# Patient Record
Sex: FEMALE | Race: WHITE | NOT HISPANIC OR LATINO | Employment: OTHER | URBAN - METROPOLITAN AREA
[De-identification: names, ages, dates, MRNs, and addresses within clinical notes are randomized per-mention and may not be internally consistent; named-entity substitution may affect disease eponyms.]

---

## 2022-02-16 ENCOUNTER — OFFICE VISIT (OUTPATIENT)
Dept: PODIATRY | Facility: CLINIC | Age: 57
End: 2022-02-16
Payer: MEDICARE

## 2022-02-16 VITALS
BODY MASS INDEX: 30.72 KG/M2 | HEIGHT: 67 IN | SYSTOLIC BLOOD PRESSURE: 100 MMHG | WEIGHT: 195.75 LBS | DIASTOLIC BLOOD PRESSURE: 68 MMHG | OXYGEN SATURATION: 97 % | HEART RATE: 65 BPM

## 2022-02-16 DIAGNOSIS — Z86.69 H/O PERIPHERAL NEUROPATHY: ICD-10-CM

## 2022-02-16 DIAGNOSIS — E11.9 COMPREHENSIVE DIABETIC FOOT EXAMINATION, TYPE 2 DM, ENCOUNTER FOR: Primary | ICD-10-CM

## 2022-02-16 DIAGNOSIS — L60.8 ACQUIRED DYSMORPHIC TOENAIL: ICD-10-CM

## 2022-02-16 DIAGNOSIS — G57.92 NERVE DAMAGE OF LEFT FOOT: ICD-10-CM

## 2022-02-16 DIAGNOSIS — E11.9 CONTROLLED TYPE 2 DIABETES MELLITUS WITHOUT COMPLICATION, WITHOUT LONG-TERM CURRENT USE OF INSULIN: ICD-10-CM

## 2022-02-16 PROCEDURE — 1160F RVW MEDS BY RX/DR IN RCRD: CPT | Mod: CPTII,S$GLB,, | Performed by: PODIATRIST

## 2022-02-16 PROCEDURE — 1160F PR REVIEW ALL MEDS BY PRESCRIBER/CLIN PHARMACIST DOCUMENTED: ICD-10-PCS | Mod: CPTII,S$GLB,, | Performed by: PODIATRIST

## 2022-02-16 PROCEDURE — 3078F PR MOST RECENT DIASTOLIC BLOOD PRESSURE < 80 MM HG: ICD-10-PCS | Mod: CPTII,S$GLB,, | Performed by: PODIATRIST

## 2022-02-16 PROCEDURE — 3074F SYST BP LT 130 MM HG: CPT | Mod: CPTII,S$GLB,, | Performed by: PODIATRIST

## 2022-02-16 PROCEDURE — 99203 OFFICE O/P NEW LOW 30 MIN: CPT | Mod: S$GLB,,, | Performed by: PODIATRIST

## 2022-02-16 PROCEDURE — 3078F DIAST BP <80 MM HG: CPT | Mod: CPTII,S$GLB,, | Performed by: PODIATRIST

## 2022-02-16 PROCEDURE — 3008F PR BODY MASS INDEX (BMI) DOCUMENTED: ICD-10-PCS | Mod: CPTII,S$GLB,, | Performed by: PODIATRIST

## 2022-02-16 PROCEDURE — 1159F MED LIST DOCD IN RCRD: CPT | Mod: CPTII,S$GLB,, | Performed by: PODIATRIST

## 2022-02-16 PROCEDURE — 3008F BODY MASS INDEX DOCD: CPT | Mod: CPTII,S$GLB,, | Performed by: PODIATRIST

## 2022-02-16 PROCEDURE — 3074F PR MOST RECENT SYSTOLIC BLOOD PRESSURE < 130 MM HG: ICD-10-PCS | Mod: CPTII,S$GLB,, | Performed by: PODIATRIST

## 2022-02-16 PROCEDURE — 1159F PR MEDICATION LIST DOCUMENTED IN MEDICAL RECORD: ICD-10-PCS | Mod: CPTII,S$GLB,, | Performed by: PODIATRIST

## 2022-02-16 PROCEDURE — 99999 PR PBB SHADOW E&M-NEW PATIENT-LVL IV: CPT | Mod: PBBFAC,,, | Performed by: PODIATRIST

## 2022-02-16 PROCEDURE — 99999 PR PBB SHADOW E&M-NEW PATIENT-LVL IV: ICD-10-PCS | Mod: PBBFAC,,, | Performed by: PODIATRIST

## 2022-02-16 PROCEDURE — 99203 PR OFFICE/OUTPT VISIT, NEW, LEVL III, 30-44 MIN: ICD-10-PCS | Mod: S$GLB,,, | Performed by: PODIATRIST

## 2022-02-16 RX ORDER — METFORMIN HYDROCHLORIDE 500 MG/1
1000 TABLET ORAL 2 TIMES DAILY
COMMUNITY
Start: 2021-12-06

## 2022-02-16 RX ORDER — ATORVASTATIN CALCIUM 20 MG/1
20 TABLET, FILM COATED ORAL DAILY
COMMUNITY
Start: 2021-09-29 | End: 2022-08-20 | Stop reason: ALTCHOICE

## 2022-02-16 RX ORDER — CYCLOBENZAPRINE HCL 10 MG
10 TABLET ORAL 2 TIMES DAILY PRN
COMMUNITY
Start: 2022-01-11 | End: 2022-08-20 | Stop reason: SDUPTHER

## 2022-02-16 RX ORDER — GABAPENTIN 400 MG/1
CAPSULE ORAL
COMMUNITY
Start: 2022-01-11

## 2022-02-16 RX ORDER — PROPRANOLOL HYDROCHLORIDE 10 MG/1
10 TABLET ORAL 2 TIMES DAILY
COMMUNITY
Start: 2022-01-31

## 2022-02-20 NOTE — PROGRESS NOTES
"Subjective:       Patient ID: Harriett Kennedy is a 56 y.o. female.    Chief Complaint: Diabetic Foot Exam  Patient presents for diabetic foot exam. Complaint of painful nail 2nd digit left, no h/o trauma.  Reports history type 2 diabetes 2-3 years.  States it is well controlled.  Does check glucose daily usually in the 90s.  Has nerve damage due to back issues/bulging discs, takes gabapentin.  No history foot sores or infections.      Past Medical History:   Diagnosis Date    Diabetes mellitus, type 2     Hyperlipidemia     Nerve damage of left foot      History reviewed. No pertinent surgical history.  History reviewed. No pertinent family history.  Social History     Socioeconomic History    Marital status: Single   Tobacco Use    Smoking status: Current Every Day Smoker     Types: Cigarettes       Current Outpatient Medications   Medication Sig Dispense Refill    atorvastatin (LIPITOR) 20 MG tablet Take 20 mg by mouth once daily.      cyclobenzaprine (FLEXERIL) 10 MG tablet Take 10 mg by mouth 2 (two) times daily as needed.      gabapentin (NEURONTIN) 400 MG capsule Take by mouth.      metFORMIN (GLUCOPHAGE) 500 MG tablet Take 1,000 mg by mouth 2 (two) times daily.      propranoloL (INDERAL) 10 MG tablet Take 10 mg by mouth 2 (two) times daily.       No current facility-administered medications for this visit.     Review of patient's allergies indicates:   Allergen Reactions    Iron analogues Nausea And Vomiting    Neomycin Rash    Sulfa (sulfonamide antibiotics)        Review of Systems   HENT: Negative for congestion.    Respiratory: Negative for cough and shortness of breath.    Cardiovascular: Negative for leg swelling.   Musculoskeletal: Negative for gait problem.   All other systems reviewed and are negative.      Objective:      Vitals:    02/16/22 1014   BP: 100/68   Pulse: 65   SpO2: 97%   Weight: 88.8 kg (195 lb 12.3 oz)   Height: 5' 7" (1.702 m)     Physical Exam  Vitals and nursing note " reviewed.   Constitutional:       General: She is not in acute distress.  Cardiovascular:      Pulses:           Dorsalis pedis pulses are 2+ on the right side and 2+ on the left side.        Posterior tibial pulses are 2+ on the right side and 2+ on the left side.   Pulmonary:      Effort: Pulmonary effort is normal.   Musculoskeletal:         General: No swelling or deformity.      Right foot: Normal range of motion.      Left foot: Normal range of motion.   Feet:      Right foot:      Protective Sensation: 6 sites tested. 6 sites sensed.      Skin integrity: Dry skin present.      Toenail Condition: Right toenails are long.      Left foot:      Protective Sensation: 6 sites tested. 4 sites sensed.      Skin integrity: Dry skin (Neglected nails, tender dystrophic fungal 2nd digit nail with no sign of infection) present.      Toenail Condition: Left toenails are abnormally thick and long. Fungal disease present.  Skin:     Capillary Refill: Capillary refill takes less than 2 seconds.   Neurological:      Comments: Detected monofilament all areas right foot, lack of sensation medial left arch region, intact elsewhere left foot.  Painful paresthesias left foot consistent with neuropathy                      Assessment:       1. Comprehensive diabetic foot examination, type 2 DM, encounter for    2. Controlled type 2 diabetes mellitus without complication, without long-term current use of insulin    3. Nerve damage of left foot    4. H/O peripheral neuropathy    5. Acquired dysmorphic toenail - Left Foot        Plan:             Diabetic pedal exam performed.    Reviewed results monofilament testing intact all areas right foot diminished medial left foot  We reviewed neuropathy left foot due to nerve damage, symptoms, gabapentin  Reviewed diabetic education  Discussed benefit of continued tight(er) control of glucose/diabetes regarding potential foot problems especially overall foot neuropathy secondary to diabetes    Reviewed wide light appropriate shoes,  especially indoors to protect feet, and for stability   Discussed maintenance of dry skin, nails, damaged fungal 2nd digit nail left foot and potential complications.    Nails debrided, thickness reduced 2nd digit left.  Had a lengthy discussion regarding care of feet, nails, over-the-counter treatments and soaking regimens.  Instructed patient to file 2nd digit nail to reduce thickness once weekly  Reviewed need for daily foot checks and instructed patient to contact the office with any area of redness or swelling which has not improved within 3 days.  Patient was in understanding and agreement with treatment plan.  I counseled the patient on their conditions, implications and medical management.  Instructed patient to contact the office with any changes, questions, concerns, worsening of symptoms.   Total face-to-face time, exam, assessment, treatment, discussion, documentation 30 minutes, more than half this time spent on consultation and coordination of care.  Follow up prn 3 months    This note was created using [employer] voice recognition software that occasionally misinterpreted phrases or words.

## 2022-05-18 ENCOUNTER — OFFICE VISIT (OUTPATIENT)
Dept: PODIATRY | Facility: CLINIC | Age: 57
End: 2022-05-18
Payer: MEDICARE

## 2022-05-18 VITALS
SYSTOLIC BLOOD PRESSURE: 107 MMHG | HEIGHT: 67 IN | WEIGHT: 195 LBS | OXYGEN SATURATION: 97 % | HEART RATE: 60 BPM | BODY MASS INDEX: 30.61 KG/M2 | DIASTOLIC BLOOD PRESSURE: 64 MMHG

## 2022-05-18 DIAGNOSIS — L60.8 ACQUIRED DYSMORPHIC TOENAIL: ICD-10-CM

## 2022-05-18 DIAGNOSIS — G57.92 NERVE DAMAGE OF LEFT FOOT: ICD-10-CM

## 2022-05-18 DIAGNOSIS — E11.9 CONTROLLED TYPE 2 DIABETES MELLITUS WITHOUT COMPLICATION, WITHOUT LONG-TERM CURRENT USE OF INSULIN: Primary | ICD-10-CM

## 2022-05-18 PROCEDURE — 3078F DIAST BP <80 MM HG: CPT | Mod: CPTII,S$GLB,, | Performed by: PODIATRIST

## 2022-05-18 PROCEDURE — 3078F PR MOST RECENT DIASTOLIC BLOOD PRESSURE < 80 MM HG: ICD-10-PCS | Mod: CPTII,S$GLB,, | Performed by: PODIATRIST

## 2022-05-18 PROCEDURE — 3074F PR MOST RECENT SYSTOLIC BLOOD PRESSURE < 130 MM HG: ICD-10-PCS | Mod: CPTII,S$GLB,, | Performed by: PODIATRIST

## 2022-05-18 PROCEDURE — 3008F BODY MASS INDEX DOCD: CPT | Mod: CPTII,S$GLB,, | Performed by: PODIATRIST

## 2022-05-18 PROCEDURE — 3074F SYST BP LT 130 MM HG: CPT | Mod: CPTII,S$GLB,, | Performed by: PODIATRIST

## 2022-05-18 PROCEDURE — 99213 PR OFFICE/OUTPT VISIT, EST, LEVL III, 20-29 MIN: ICD-10-PCS | Mod: S$GLB,,, | Performed by: PODIATRIST

## 2022-05-18 PROCEDURE — 99999 PR PBB SHADOW E&M-EST. PATIENT-LVL III: ICD-10-PCS | Mod: PBBFAC,,, | Performed by: PODIATRIST

## 2022-05-18 PROCEDURE — 1159F MED LIST DOCD IN RCRD: CPT | Mod: CPTII,S$GLB,, | Performed by: PODIATRIST

## 2022-05-18 PROCEDURE — 1159F PR MEDICATION LIST DOCUMENTED IN MEDICAL RECORD: ICD-10-PCS | Mod: CPTII,S$GLB,, | Performed by: PODIATRIST

## 2022-05-18 PROCEDURE — 3008F PR BODY MASS INDEX (BMI) DOCUMENTED: ICD-10-PCS | Mod: CPTII,S$GLB,, | Performed by: PODIATRIST

## 2022-05-18 PROCEDURE — 99213 OFFICE O/P EST LOW 20 MIN: CPT | Mod: S$GLB,,, | Performed by: PODIATRIST

## 2022-05-18 PROCEDURE — 99999 PR PBB SHADOW E&M-EST. PATIENT-LVL III: CPT | Mod: PBBFAC,,, | Performed by: PODIATRIST

## 2022-05-18 RX ORDER — CYCLOBENZAPRINE HCL 10 MG
22 TABLET ORAL
COMMUNITY
Start: 2022-04-12 | End: 2022-08-10

## 2022-05-18 RX ORDER — ACETAMINOPHEN, ASPIRIN (NSAID), AND CAFFEINE 250; 250; 65 MG/1; MG/1; MG/1
TABLET, FILM COATED ORAL
COMMUNITY
Start: 2021-11-08

## 2022-05-21 NOTE — PROGRESS NOTES
Subjective:       Patient ID: Harriett Kennedy is a 56 y.o. female.    Chief Complaint: Diabetic Foot Exam  Patient presents for follow-up pain 2nd digit left foot, nerve damage/neuropathy left foot, history of diabetes.  Patient relates improvement 2nd digit, unfortunately no change overall neuropathy pain left foot which she feels is closely due to chronic back issues/bulging discs, takes gabapentin.  She has been experiencing some swelling on the top of her feet left greater than right.  Reports glucose was 93 this morning       Past Medical History:   Diagnosis Date    Diabetes mellitus, type 2     Hyperlipidemia     Nerve damage of left foot      History reviewed. No pertinent surgical history.  History reviewed. No pertinent family history.  Social History     Socioeconomic History    Marital status: Single   Tobacco Use    Smoking status: Current Every Day Smoker     Types: Cigarettes       Current Outpatient Medications   Medication Sig Dispense Refill    aspirin-acetaminophen-caffeine 250-250-65 mg (EXCEDRIN EXTRA STRENGTH) 250-250-65 mg per tablet   2 tab, Oral, q6h, PRN for headache, # 50 tab, 0 Refill(s)      atorvastatin (LIPITOR) 20 MG tablet Take 20 mg by mouth once daily.      cyclobenzaprine (FLEXERIL) 10 MG tablet Take 10 mg by mouth 2 (two) times daily as needed.      gabapentin (NEURONTIN) 400 MG capsule Take by mouth.      metFORMIN (GLUCOPHAGE) 500 MG tablet Take 1,000 mg by mouth 2 (two) times daily.      propranoloL (INDERAL) 10 MG tablet Take 10 mg by mouth 2 (two) times daily.      cyclobenzaprine (FLEXERIL) 10 MG tablet 22 mg.       No current facility-administered medications for this visit.     Review of patient's allergies indicates:   Allergen Reactions    Iron analogues Nausea And Vomiting    Neomycin Rash    Sulfa (sulfonamide antibiotics)        Review of Systems   HENT: Negative for congestion.    Respiratory: Negative for cough.    Cardiovascular: Negative for leg  "swelling.   Musculoskeletal: Negative for gait problem.   All other systems reviewed and are negative.      Objective:      Vitals:    05/18/22 1036   BP: 107/64   Pulse: 60   SpO2: 97%   Weight: 88.5 kg (195 lb)   Height: 5' 7" (1.702 m)     Physical Exam  Vitals and nursing note reviewed.   Constitutional:       General: She is not in acute distress.  Cardiovascular:      Pulses:           Dorsalis pedis pulses are 2+ on the right side and 2+ on the left side.        Posterior tibial pulses are 2+ on the right side and 2+ on the left side.   Pulmonary:      Effort: Pulmonary effort is normal.   Musculoskeletal:         General: No deformity.      Right foot: Normal range of motion.      Left foot: Normal range of motion.   Feet:      Right foot:      Skin integrity: No dry skin.      Toenail Condition: Right toenails are long.      Left foot:      Skin integrity: No dry skin (Skin much improved, better maintenance of 2nd digit nail left foot which is severely dystrophic a).      Toenail Condition: Left toenails are abnormally thick and long. Fungal disease present.  Skin:     Capillary Refill: Capillary refill takes less than 2 seconds.   Neurological:      Comments: Diminished sensation with neuropathy pain left foot   Psychiatric:         Mood and Affect: Mood normal.         Behavior: Behavior normal.            Assessment:       1. Controlled type 2 diabetes mellitus without complication, without long-term current use of insulin    2. Acquired dysmorphic toenail - Left Foot    3. Nerve damage of left foot        Plan:               Reviewed diabetic education  Reviewed benefit of continued tight(er) control of glucose/diabetes regarding   I am in agreement with the patient nerve damage left foot most likely due to her back as these symptoms are not evenly diffuse throughout both feet as they would be if it were related to diabetes  We reviewed conservative treatments for the left foot, soaking, warm water jets " movement, topical treatments  Reviewed wide light appropriate shoes,  especially indoors to protect feet, and for stability   Reviewed maintenance of dry skin, nails, damaged fungal 2nd digit nail left foot and potential complications.    Nails debrided, thickness reduced 2nd digit left.  Reviewed treatments and soaking regimens.   Reviewed need for daily foot checks and instructed patient to contact the office with any area of redness or swelling which has not improved within 3 days.  Patient was in understanding and agreement with treatment plan.  I counseled the patient on their conditions, implications and medical management.  Instructed patient to contact the office with any changes, questions, concerns, worsening of symptoms.   Total face-to-face time, exam, assessment, treatment, discussion, documentation 20 minutes, more than half this time spent on consultation and coordination of care.  Follow up prn 3 months    This note was created using [employer] voice recognition software that occasionally misinterpreted phrases or words.         none

## 2022-08-17 ENCOUNTER — OFFICE VISIT (OUTPATIENT)
Dept: PODIATRY | Facility: CLINIC | Age: 57
End: 2022-08-17
Payer: MEDICARE

## 2022-08-17 VITALS
RESPIRATION RATE: 16 BRPM | WEIGHT: 191.56 LBS | HEIGHT: 67 IN | DIASTOLIC BLOOD PRESSURE: 70 MMHG | SYSTOLIC BLOOD PRESSURE: 109 MMHG | BODY MASS INDEX: 30.07 KG/M2 | HEART RATE: 70 BPM

## 2022-08-17 DIAGNOSIS — E11.9 CONTROLLED TYPE 2 DIABETES MELLITUS WITHOUT COMPLICATION, WITHOUT LONG-TERM CURRENT USE OF INSULIN: Primary | ICD-10-CM

## 2022-08-17 DIAGNOSIS — L60.8 ACQUIRED DYSMORPHIC TOENAIL: ICD-10-CM

## 2022-08-17 DIAGNOSIS — G57.92 NERVE DAMAGE OF LEFT FOOT: ICD-10-CM

## 2022-08-17 PROCEDURE — 3008F BODY MASS INDEX DOCD: CPT | Mod: CPTII,S$GLB,, | Performed by: PODIATRIST

## 2022-08-17 PROCEDURE — 3074F PR MOST RECENT SYSTOLIC BLOOD PRESSURE < 130 MM HG: ICD-10-PCS | Mod: CPTII,S$GLB,, | Performed by: PODIATRIST

## 2022-08-17 PROCEDURE — 3074F SYST BP LT 130 MM HG: CPT | Mod: CPTII,S$GLB,, | Performed by: PODIATRIST

## 2022-08-17 PROCEDURE — 99999 PR PBB SHADOW E&M-EST. PATIENT-LVL IV: CPT | Mod: PBBFAC,,, | Performed by: PODIATRIST

## 2022-08-17 PROCEDURE — 1159F MED LIST DOCD IN RCRD: CPT | Mod: CPTII,S$GLB,, | Performed by: PODIATRIST

## 2022-08-17 PROCEDURE — 1160F RVW MEDS BY RX/DR IN RCRD: CPT | Mod: CPTII,S$GLB,, | Performed by: PODIATRIST

## 2022-08-17 PROCEDURE — 3008F PR BODY MASS INDEX (BMI) DOCUMENTED: ICD-10-PCS | Mod: CPTII,S$GLB,, | Performed by: PODIATRIST

## 2022-08-17 PROCEDURE — 99999 PR PBB SHADOW E&M-EST. PATIENT-LVL IV: ICD-10-PCS | Mod: PBBFAC,,, | Performed by: PODIATRIST

## 2022-08-17 PROCEDURE — 3078F PR MOST RECENT DIASTOLIC BLOOD PRESSURE < 80 MM HG: ICD-10-PCS | Mod: CPTII,S$GLB,, | Performed by: PODIATRIST

## 2022-08-17 PROCEDURE — 99213 OFFICE O/P EST LOW 20 MIN: CPT | Mod: S$GLB,,, | Performed by: PODIATRIST

## 2022-08-17 PROCEDURE — 1159F PR MEDICATION LIST DOCUMENTED IN MEDICAL RECORD: ICD-10-PCS | Mod: CPTII,S$GLB,, | Performed by: PODIATRIST

## 2022-08-17 PROCEDURE — 1160F PR REVIEW ALL MEDS BY PRESCRIBER/CLIN PHARMACIST DOCUMENTED: ICD-10-PCS | Mod: CPTII,S$GLB,, | Performed by: PODIATRIST

## 2022-08-17 PROCEDURE — 99213 PR OFFICE/OUTPT VISIT, EST, LEVL III, 20-29 MIN: ICD-10-PCS | Mod: S$GLB,,, | Performed by: PODIATRIST

## 2022-08-17 PROCEDURE — 3078F DIAST BP <80 MM HG: CPT | Mod: CPTII,S$GLB,, | Performed by: PODIATRIST

## 2022-08-17 RX ORDER — AMOXICILLIN 500 MG/1
500 CAPSULE ORAL 3 TIMES DAILY
COMMUNITY
Start: 2022-08-03 | End: 2022-08-20

## 2022-08-17 RX ORDER — ROSUVASTATIN CALCIUM 20 MG/1
20 TABLET, COATED ORAL NIGHTLY
COMMUNITY
Start: 2022-06-21

## 2022-08-17 RX ORDER — CYCLOBENZAPRINE HCL 10 MG
TABLET ORAL
COMMUNITY
Start: 2022-07-12 | End: 2022-11-09

## 2022-08-17 RX ORDER — ACETAMINOPHEN AND CODEINE PHOSPHATE 300; 30 MG/1; MG/1
TABLET ORAL
COMMUNITY
Start: 2022-08-09 | End: 2022-08-20

## 2022-08-20 NOTE — PROGRESS NOTES
Subjective:       Patient ID: Harriett Kennedy is a 56 y.o. female.    Chief Complaint: Follow-up and Diabetes Mellitus  Patient presents for follow-up diabetes nerve damage left foot. Chronic back issues/bulging discs, takes gabapentin. Relates almost 40 lb weight loss, changed the way she is cooking her foot.  Relates diabetes has been very well controlled.  Reports glucose was 97 this morning. Relates discomfort 2nd digit nail left       Past Medical History:   Diagnosis Date    Diabetes mellitus, type 2     Hyperlipidemia     Nerve damage of left foot      History reviewed. No pertinent surgical history.  History reviewed. No pertinent family history.  Social History     Socioeconomic History    Marital status: Single   Tobacco Use    Smoking status: Current Every Day Smoker     Types: Cigarettes    Smokeless tobacco: Never Used   Substance and Sexual Activity    Alcohol use: Not Currently    Drug use: Not Currently       Current Outpatient Medications   Medication Sig Dispense Refill    aspirin-acetaminophen-caffeine 250-250-65 mg (EXCEDRIN EXTRA STRENGTH) 250-250-65 mg per tablet   2 tab, Oral, q6h, PRN for headache, # 50 tab, 0 Refill(s)      cyclobenzaprine (FLEXERIL) 10 MG tablet   = 1 tab, Oral, BID, PRN AS NEEDED FOR MUSCLE SPASM, # 90 tab, 3 Refill(s), Maintenance, Pharmacy: [id number], 172, cm, 07/12/22 11:41:00 CDT, Height/Length Measured, 89.9, kg, 03/03/22 10:40:00 CST, Weight Dosing      gabapentin (NEURONTIN) 400 MG capsule Take by mouth.      metFORMIN (GLUCOPHAGE) 500 MG tablet Take 1,000 mg by mouth 2 (two) times daily.      rosuvastatin (CRESTOR) 20 MG tablet Take 20 mg by mouth nightly.      propranoloL (INDERAL) 10 MG tablet Take 10 mg by mouth 2 (two) times daily.       No current facility-administered medications for this visit.     Review of patient's allergies indicates:   Allergen Reactions    Iron analogues Nausea And Vomiting    Neomycin Rash    Sulfa  "(sulfonamide antibiotics)        Review of Systems   HENT: Negative for congestion.    Respiratory: Negative for cough and shortness of breath.    Cardiovascular: Negative for leg swelling.   Musculoskeletal: Negative for gait problem.   All other systems reviewed and are negative.      Objective:      Vitals:    08/17/22 1114   BP: 109/70   Pulse: 70   Resp: 16   Weight: 86.9 kg (191 lb 8.8 oz)   Height: 5' 7" (1.702 m)     Physical Exam  Vitals and nursing note reviewed.   Constitutional:       General: She is not in acute distress.  Cardiovascular:      Pulses:           Dorsalis pedis pulses are 2+ on the right side and 2+ on the left side.        Posterior tibial pulses are 2+ on the right side and 2+ on the left side.   Pulmonary:      Effort: Pulmonary effort is normal.   Musculoskeletal:         General: No deformity.      Right foot: Normal range of motion.      Left foot: Normal range of motion.   Feet:      Right foot:      Skin integrity: No dry skin.      Toenail Condition: Right toenails are long.      Left foot:      Skin integrity: No dry skin (tender 2nd digit nail left foot which is severely dystrophic, no infection).      Toenail Condition: Left toenails are abnormally thick and long.   Skin:     Capillary Refill: Capillary refill takes less than 2 seconds.   Neurological:      Comments: Diminished sensation with neuropathy pain left foot   Psychiatric:         Mood and Affect: Mood normal.         Behavior: Behavior normal.                    Assessment:       1. Controlled type 2 diabetes mellitus without complication, without long-term current use of insulin    2. Nerve damage of left foot    3. Acquired dysmorphic toenail - Left Foot        Plan:           Reviewed diabetic education  Reviewed benefit of continued tight control of glucose/diabetes  Reviewed conservative treatments for the left foot pain  Reviewed wide light appropriate shoes, indoors to protect feet and for stability "   Reviewed maintenance of dry skin, nails, damaged fungal 2nd digit nail left foot and potential complications.    Nails debrided, thickness reduced 2nd digit left.  Reviewed topical treatments  Reviewed need for daily foot checks and instructed patient to contact the office with any area of redness or swelling which has not improved within 3 days.  Patient was in understanding and agreement with treatment plan.  I counseled the patient on their conditions, implications and medical management.  Instructed patient to contact the office with any changes, questions, concerns, worsening of symptoms.   Total face-to-face time, exam, assessment, treatment, discussion, documentation 20 minutes, more than half this time spent on consultation and coordination of care.  Follow up prn 3 months    This note was created using [employer] voice recognition software that occasionally misinterpreted phrases or words.

## 2022-11-30 ENCOUNTER — OFFICE VISIT (OUTPATIENT)
Dept: PODIATRY | Facility: CLINIC | Age: 57
End: 2022-11-30
Payer: MEDICARE

## 2022-11-30 VITALS
BODY MASS INDEX: 30.1 KG/M2 | WEIGHT: 191.81 LBS | HEART RATE: 62 BPM | SYSTOLIC BLOOD PRESSURE: 113 MMHG | HEIGHT: 67 IN | RESPIRATION RATE: 16 BRPM | DIASTOLIC BLOOD PRESSURE: 72 MMHG

## 2022-11-30 DIAGNOSIS — G57.92 NERVE DAMAGE OF LEFT FOOT: ICD-10-CM

## 2022-11-30 DIAGNOSIS — E11.9 CONTROLLED TYPE 2 DIABETES MELLITUS WITHOUT COMPLICATION, WITHOUT LONG-TERM CURRENT USE OF INSULIN: ICD-10-CM

## 2022-11-30 DIAGNOSIS — R60.0 PEDAL EDEMA: Primary | ICD-10-CM

## 2022-11-30 PROCEDURE — 3008F PR BODY MASS INDEX (BMI) DOCUMENTED: ICD-10-PCS | Mod: CPTII,S$GLB,, | Performed by: PODIATRIST

## 2022-11-30 PROCEDURE — 3078F PR MOST RECENT DIASTOLIC BLOOD PRESSURE < 80 MM HG: ICD-10-PCS | Mod: CPTII,S$GLB,, | Performed by: PODIATRIST

## 2022-11-30 PROCEDURE — 1159F PR MEDICATION LIST DOCUMENTED IN MEDICAL RECORD: ICD-10-PCS | Mod: CPTII,S$GLB,, | Performed by: PODIATRIST

## 2022-11-30 PROCEDURE — 3074F SYST BP LT 130 MM HG: CPT | Mod: CPTII,S$GLB,, | Performed by: PODIATRIST

## 2022-11-30 PROCEDURE — 99999 PR PBB SHADOW E&M-EST. PATIENT-LVL IV: CPT | Mod: PBBFAC,,, | Performed by: PODIATRIST

## 2022-11-30 PROCEDURE — 99213 OFFICE O/P EST LOW 20 MIN: CPT | Mod: S$GLB,,, | Performed by: PODIATRIST

## 2022-11-30 PROCEDURE — 99999 PR PBB SHADOW E&M-EST. PATIENT-LVL IV: ICD-10-PCS | Mod: PBBFAC,,, | Performed by: PODIATRIST

## 2022-11-30 PROCEDURE — 99213 PR OFFICE/OUTPT VISIT, EST, LEVL III, 20-29 MIN: ICD-10-PCS | Mod: S$GLB,,, | Performed by: PODIATRIST

## 2022-11-30 PROCEDURE — 3008F BODY MASS INDEX DOCD: CPT | Mod: CPTII,S$GLB,, | Performed by: PODIATRIST

## 2022-11-30 PROCEDURE — 1159F MED LIST DOCD IN RCRD: CPT | Mod: CPTII,S$GLB,, | Performed by: PODIATRIST

## 2022-11-30 PROCEDURE — 3074F PR MOST RECENT SYSTOLIC BLOOD PRESSURE < 130 MM HG: ICD-10-PCS | Mod: CPTII,S$GLB,, | Performed by: PODIATRIST

## 2022-11-30 PROCEDURE — 3078F DIAST BP <80 MM HG: CPT | Mod: CPTII,S$GLB,, | Performed by: PODIATRIST

## 2022-11-30 RX ORDER — ESCITALOPRAM OXALATE 10 MG/1
10 TABLET ORAL
COMMUNITY
Start: 2022-09-07 | End: 2022-12-06

## 2022-11-30 RX ORDER — ALPRAZOLAM 0.25 MG/1
TABLET ORAL
COMMUNITY
Start: 2022-09-21

## 2022-11-30 NOTE — PROGRESS NOTES
Subjective:       Patient ID: Harriett Kennedy is a 57 y.o. female.    Chief Complaint: Follow-up, Diabetes Mellitus, Foot Swelling, and Foot Pain  Patient presents for follow-up diabetes nerve damage left foot. Noticed swelling just in her feet this morning.  Has foot pain all the time especially in the left.  Chronic back issues/bulging discs, takes gabapentin. Reports diabetes x 1 year, well controlled, glucose was 95 this morning.       Past Medical History:   Diagnosis Date    Diabetes mellitus, type 2     Hyperlipidemia     Nerve damage of left foot      History reviewed. No pertinent surgical history.  History reviewed. No pertinent family history.  Social History     Socioeconomic History    Marital status: Single   Tobacco Use    Smoking status: Every Day     Types: Cigarettes    Smokeless tobacco: Never   Substance and Sexual Activity    Alcohol use: Not Currently    Drug use: Not Currently       Current Outpatient Medications   Medication Sig Dispense Refill    ALPRAZolam (XANAX) 0.25 MG tablet   See Instructions, Take 1 tab 30 minutes to procedure and may repeat in 30 minutes if needed, # 2 tab, 0 Refill(s), Maintenance, Pharmacy: [id number], Situational anxiety, 172, cm, 09/21/22 10:05:00 CDT, Height/Length Measured, 87.5, kg, 09/21/22...      aspirin-acetaminophen-caffeine 250-250-65 mg (EXCEDRIN EXTRA STRENGTH) 250-250-65 mg per tablet   2 tab, Oral, q6h, PRN for headache, # 50 tab, 0 Refill(s)      EScitalopram oxalate (LEXAPRO) 10 MG tablet 10 mg.      gabapentin (NEURONTIN) 400 MG capsule Take by mouth.      metFORMIN (GLUCOPHAGE) 500 MG tablet Take 1,000 mg by mouth 2 (two) times daily.      propranoloL (INDERAL) 10 MG tablet Take 10 mg by mouth 2 (two) times daily.      rosuvastatin (CRESTOR) 20 MG tablet Take 20 mg by mouth nightly.       No current facility-administered medications for this visit.     Review of patient's allergies indicates:   Allergen Reactions    Iron analogues Nausea  "And Vomiting    Neomycin Rash    Sulfa (sulfonamide antibiotics)        Review of Systems   HENT:  Negative for congestion.    Respiratory:  Negative for cough.    Musculoskeletal:  Negative for gait problem.   All other systems reviewed and are negative.    Objective:      Vitals:    11/30/22 0954   BP: 113/72   Pulse: 62   Resp: 16   Weight: 87 kg (191 lb 12.8 oz)   Height: 5' 7" (1.702 m)     Physical Exam  Vitals and nursing note reviewed.   Constitutional:       General: She is not in acute distress.  Cardiovascular:      Pulses:           Dorsalis pedis pulses are 2+ on the right side and 2+ on the left side.        Posterior tibial pulses are 2+ on the right side and 2+ on the left side.   Pulmonary:      Effort: Pulmonary effort is normal.   Musculoskeletal:         General: No deformity.      Right foot: Normal range of motion.      Left foot: Normal range of motion.      Comments: Pedal edema b/l   Feet:      Right foot:      Skin integrity: Dry skin present.      Toenail Condition: Right toenails are long.      Left foot:      Skin integrity: Dry skin (mild dry skin heels) present.      Toenail Condition: Left toenails are abnormally thick and long.   Skin:     Capillary Refill: Capillary refill takes less than 2 seconds.   Neurological:      Comments: Diminished sensation with neuropathy pain left foot   Psychiatric:         Mood and Affect: Mood normal.         Behavior: Behavior normal.         Thought Content: Thought content normal.         Judgment: Judgment normal.                        Assessment:       1. Pedal edema    2. Controlled type 2 diabetes mellitus without complication, without long-term current use of insulin    3. Nerve damage of left foot        Plan:         Reviewed swelling in feet with patient, potential complications.  Advised it is only located on the top of the feet, this is most likely shoe related.  Advised patient to utilize a wider tennis shoe, soft fabric, light weight, " should be worn indoors as well to protect and support feet especially with back problems.  Recommended [place]  We reviewed appropriate indoor shoes  Reviewed diabetic education  Reviewed neuropathy which appears to be more back related than diabetic related  Reviewed maintenance of dry skin, nails, damaged fungal 2nd digit nail left foot and potential complications.    Nails debrided, thickness reduced 2nd digit left.  Reviewed topical treatments  Reviewed need for daily foot checks and instructed patient to contact the office with any area of redness or swelling which has not improved within 3 days.  Patient was in understanding and agreement with treatment plan.  I counseled the patient on their conditions, implications and medical management.  Instructed patient to contact the office with any changes, questions, concerns, worsening of symptoms.   Total face-to-face time, exam, assessment, treatment, discussion, documentation 20 minutes, more than half this time spent on consultation and coordination of care.  Follow up 3 months, annual DFE    This note was created using [employer] voice recognition software that occasionally misinterpreted phrases or words.